# Patient Record
Sex: FEMALE | Race: BLACK OR AFRICAN AMERICAN | URBAN - METROPOLITAN AREA
[De-identification: names, ages, dates, MRNs, and addresses within clinical notes are randomized per-mention and may not be internally consistent; named-entity substitution may affect disease eponyms.]

---

## 2023-05-31 ENCOUNTER — LAB OUTSIDE AN ENCOUNTER (OUTPATIENT)
Dept: URBAN - METROPOLITAN AREA CLINIC 88 | Facility: CLINIC | Age: 53
End: 2023-05-31

## 2023-05-31 ENCOUNTER — WEB ENCOUNTER (OUTPATIENT)
Dept: URBAN - METROPOLITAN AREA CLINIC 88 | Facility: CLINIC | Age: 53
End: 2023-05-31

## 2023-05-31 ENCOUNTER — DASHBOARD ENCOUNTERS (OUTPATIENT)
Age: 53
End: 2023-05-31

## 2023-05-31 ENCOUNTER — OFFICE VISIT (OUTPATIENT)
Dept: URBAN - METROPOLITAN AREA CLINIC 88 | Facility: CLINIC | Age: 53
End: 2023-05-31
Payer: COMMERCIAL

## 2023-05-31 VITALS
HEART RATE: 88 BPM | TEMPERATURE: 97.5 F | HEIGHT: 67 IN | WEIGHT: 225.4 LBS | SYSTOLIC BLOOD PRESSURE: 179 MMHG | DIASTOLIC BLOOD PRESSURE: 112 MMHG | BODY MASS INDEX: 35.38 KG/M2

## 2023-05-31 DIAGNOSIS — K85.00 IDIOPATHIC ACUTE PANCREATITIS WITHOUT INFECTION OR NECROSIS: ICD-10-CM

## 2023-05-31 DIAGNOSIS — K80.20 CALCULUS OF GALLBLADDER WITHOUT CHOLECYSTITIS WITHOUT OBSTRUCTION: ICD-10-CM

## 2023-05-31 DIAGNOSIS — Z12.11 ENCOUNTER FOR SCREENING COLONOSCOPY: ICD-10-CM

## 2023-05-31 DIAGNOSIS — R10.10 UPPER ABDOMINAL PAIN: ICD-10-CM

## 2023-05-31 DIAGNOSIS — K58.1 IRRITABLE BOWEL SYNDROME WITH CONSTIPATION: ICD-10-CM

## 2023-05-31 PROBLEM — 70342003: Status: ACTIVE | Noted: 2023-05-31

## 2023-05-31 PROBLEM — 440630006: Status: ACTIVE | Noted: 2023-05-31

## 2023-05-31 PROCEDURE — 99205 OFFICE O/P NEW HI 60 MIN: CPT | Performed by: NURSE PRACTITIONER

## 2023-05-31 RX ORDER — DULOXETINE HYDROCHLORIDE 60 MG/1
CAPSULE, DELAYED RELEASE PELLETS ORAL
Qty: 90 CAPSULE | Status: ACTIVE | COMMUNITY

## 2023-05-31 RX ORDER — DICYCLOMINE HYDROCHLORIDE 10 MG/1
CAPSULE ORAL
Qty: 360 CAPSULE | Status: ACTIVE | COMMUNITY

## 2023-05-31 RX ORDER — LOSARTAN POTASSIUM 100 MG/1
TABLET, FILM COATED ORAL
Qty: 90 TABLET | Status: ACTIVE | COMMUNITY

## 2023-05-31 RX ORDER — INSULIN HUMAN 100 [IU]/ML
AS DIRECTED INJECTION, SUSPENSION SUBCUTANEOUS
Status: ACTIVE | COMMUNITY

## 2023-05-31 RX ORDER — GLIPIZIDE 5 MG/1
1 TABLET 30 MINUTES BEFORE BREAKFAST TABLET ORAL ONCE A DAY
Status: ACTIVE | COMMUNITY

## 2023-05-31 RX ORDER — LACTULOSE 10 G/15ML
TAKE 30 ML SOLUTION ORAL
Qty: 1800 ML | Refills: 5 | OUTPATIENT
Start: 2023-05-31 | End: 2023-11-26

## 2023-05-31 RX ORDER — PANTOPRAZOLE SODIUM 40 MG/1
1 TABLET TABLET, DELAYED RELEASE ORAL
Qty: 90 | Refills: 1 | OUTPATIENT
Start: 2023-05-31

## 2023-05-31 RX ORDER — OXYCODONE HYDROCHLORIDE AND ACETAMINOPHEN 10; 325 MG/1; MG/1
TABLET ORAL
Qty: 60 TABLET | Status: ACTIVE | COMMUNITY

## 2023-05-31 RX ORDER — METOPROLOL SUCCINATE 50 MG/1
TABLET, EXTENDED RELEASE ORAL
Qty: 90 UNSPECIFIED | Status: ACTIVE | COMMUNITY

## 2023-05-31 RX ORDER — ATORVASTATIN CALCIUM 40 MG/1
TABLET, FILM COATED ORAL
Qty: 90 TABLET | Status: ACTIVE | COMMUNITY

## 2023-05-31 RX ORDER — LINACLOTIDE 290 UG/1
1 CAPSULE AT LEAST 30 MINUTES BEFORE THE FIRST MEAL OF THE DAY ON AN EMPTY STOMACH CAPSULE, GELATIN COATED ORAL ONCE A DAY
Qty: 90 | Refills: 3 | OUTPATIENT
Start: 2023-05-31 | End: 2024-05-25

## 2023-05-31 RX ORDER — SPIRONOLACTONE 50 MG/1
TABLET, FILM COATED ORAL
Qty: 90 TABLET | Status: ACTIVE | COMMUNITY

## 2023-05-31 RX ORDER — INSULIN HUMAN 100 [IU]/ML
AS DIRECTED INJECTION, SOLUTION PARENTERAL
Status: ACTIVE | COMMUNITY

## 2023-05-31 RX ORDER — EMPAGLIFLOZIN 25 MG/1
1 TABLET TABLET, FILM COATED ORAL ONCE A DAY
Qty: 90 TABLET | Status: ACTIVE | COMMUNITY

## 2023-05-31 RX ORDER — METFORMIN HYDROCHLORIDE 750 MG/1
TABLET, EXTENDED RELEASE ORAL
Qty: 180 TABLET | Status: ACTIVE | COMMUNITY

## 2023-05-31 NOTE — PHYSICAL EXAM GASTROINTESTINAL
Abdomen , soft, epigastric, LUQ tenderness, nondistended , no guarding or rigidity , no masses palpable , normal bowel sounds , Liver and Spleen: no hepatosplenomegaly

## 2023-05-31 NOTE — HPI-TODAY'S VISIT:
Referred by Dr. Rosio Good for evaluation of LUQ abdominal pain and to discuss screening colonoscopy.  A copy of this note will be sent to the referring provider.  Voices increase pressure sensation to LUQ for over a year.  Pain has started to RUQ over the 3 months.  This is daily complaint that is aggravated by eating and drinking in general.  Associated symptoms:  nausea w/o vomiting, excessive flatulence, early satiety, constipation. Aggravated symptoms:  constipation and eating.   Alleviating factors:  flatulence, taking pain medications as needed.  Has a hx of CVA in 2020 and unsure if constipation became an issue after this.  Defecation occurs once every 3-4 days.  Fails to experience a complete sense of evacuation.  Has tried MiraLAX, magnesium citrate, fiber supplements and OTC laxatives over the years.   Previously evaluated by [facility]  last year.  Provided samples of  Linzess 290 mcg with adequate results voiced.  Currently, w/o prescription for medication.  Was scheduled for screening colonoscopy with this group as well but had to cancel procedure due to insurance change.    Patient was admitted to hospital at [facility] in July 2022 (pancreatitis) and December 2022 (ER visit due to abdominal pain).  Presented to ED on July 13, 2022 due to persistent nosebleed and worsening abdominal pain.  CT A/P on 07/17/2022:  mild pancreatitis near pancreatic head, duodenitis and mild proctitis.  Unsure of etiology of pancreatitis.  Denies heavy consumption of alcohol, prior hx of pancreatitis or elevated TG.  Underwent EGD by Dr. Fernando on 07/23/2022:  mild reflux esophagitis, h. pylori gastritis, normal duodenum, 20 mm non-bleeding diverticulum in second portion of duodenum.      Evaluated in ED at [facility] in December 2022.  CT A/P revealed large colonic fecal burden, non mass like "hypoattenuation" of pancreatic head noted possiblility sequelae of prior pancreatitis, small fat inguinal hernia and mild cystitis.  RUQ US revealed cholelithiasis with no acute cholecystitis.  Labs:  WBC 10.50, Hgb 12.5, MCV 79.7, , TG 99, ALT 11, AST 19, Alk Phos 71, TB 0.4, Hgb A1c 6.2%, Iron 59, Iron Sat 20%.  Denies prior colonoscopy or family hx of colon cancer.

## 2023-06-07 ENCOUNTER — TELEPHONE ENCOUNTER (OUTPATIENT)
Dept: URBAN - METROPOLITAN AREA CLINIC 70 | Facility: CLINIC | Age: 53
End: 2023-06-07

## 2023-06-07 LAB
A/G RATIO: 1.6
ALBUMIN: 3.9
ALKALINE PHOSPHATASE: 99
ALT (SGPT): 7
AMYLASE: 31
AST (SGOT): 15
BILIRUBIN, TOTAL: 0.4
BUN/CREATININE RATIO: 20
BUN: 30
CALCIUM: 8.7
CARBON DIOXIDE, TOTAL: 27
CHLORIDE: 106
CREATININE: 1.49
EGFR: 43
GLOBULIN, TOTAL: 2.5
GLUCOSE: 136
HEMATOCRIT: 35.2
HEMOGLOBIN: 11.7
IGG, SUBCLASS 4: 37.6
LIPASE: 20
MCH: 27.1
MCHC: 33.2
MCV: 81.7
MPV: 12.2
PLATELET COUNT: 156
POTASSIUM: 4
PROTEIN, TOTAL: 6.4
RDW: 12.4
RED BLOOD CELL COUNT: 4.31
SODIUM: 140
WHITE BLOOD CELL COUNT: 6.8

## 2023-06-09 ENCOUNTER — OFFICE VISIT (OUTPATIENT)
Dept: URBAN - METROPOLITAN AREA SURGERY CENTER 24 | Facility: SURGERY CENTER | Age: 53
End: 2023-06-09

## 2023-06-09 ENCOUNTER — TELEPHONE ENCOUNTER (OUTPATIENT)
Dept: URBAN - METROPOLITAN AREA CLINIC 70 | Facility: CLINIC | Age: 53
End: 2023-06-09

## 2023-06-16 ENCOUNTER — CLAIMS CREATED FROM THE CLAIM WINDOW (OUTPATIENT)
Dept: URBAN - METROPOLITAN AREA CLINIC 4 | Facility: CLINIC | Age: 53
End: 2023-06-16
Payer: COMMERCIAL

## 2023-06-16 ENCOUNTER — OFFICE VISIT (OUTPATIENT)
Dept: URBAN - METROPOLITAN AREA SURGERY CENTER 24 | Facility: SURGERY CENTER | Age: 53
End: 2023-06-16
Payer: COMMERCIAL

## 2023-06-16 DIAGNOSIS — D12.0 ADENOMA OF CECUM: ICD-10-CM

## 2023-06-16 DIAGNOSIS — Z12.11 ENCOUNTER FOR SCREENING COLONOSCOPY: ICD-10-CM

## 2023-06-16 DIAGNOSIS — D12.0 BENIGN NEOPLASM OF CECUM: ICD-10-CM

## 2023-06-16 PROCEDURE — 88305 TISSUE EXAM BY PATHOLOGIST: CPT | Performed by: PATHOLOGY

## 2023-06-16 PROCEDURE — 45385 COLONOSCOPY W/LESION REMOVAL: CPT | Performed by: INTERNAL MEDICINE

## 2023-06-16 PROCEDURE — G8907 PT DOC NO EVENTS ON DISCHARG: HCPCS | Performed by: INTERNAL MEDICINE
